# Patient Record
(demographics unavailable — no encounter records)

---

## 2025-06-19 NOTE — PLAN
[TextEntry] : - Consent signed for EUA/hysteroscopy/D&C/polypectomy - Will need pre-op PST - OR  will call with date/time surgery

## 2025-06-19 NOTE — PHYSICAL EXAM
[MA] : MA [Normal] : No focal neurologic defects observed [Fully active, able to carry on all pre-disease performance without restriction] : Status 0 - Fully active, able to carry on all pre-disease performance without restriction [FreeTextEntry2] : Riddhi Stiles MA [de-identified] : soft, non-tender, non-distended [de-identified] : SSE: NEFG, estrogenized vaginal mucosa, grossly normal appearing cervix w/o lesions or nodularity BME: small 8wk size mobile anteverted uterus, no palpable adnexal masses

## 2025-06-19 NOTE — CHIEF COMPLAINT
[Language Line ] : provided by Language Line   [FreeTextEntry1] : [place] Location  [facility] [place] [phone] [address] [place] [address]  CC: Uterine polyp [Interpreters_IDNumber] : [place] [Interpreters_FullName] : [id number] [Interpreters_Relationshiptopatient] : Partner initially ad hoc  [TWNoteComboBox1] : [place]

## 2025-06-19 NOTE — ASSESSMENT
[FreeTextEntry1] : Ms. Brizuela is a 32yo G0 who presents as a referral from Dr. Leone for uterine polyp.  We discussed the importance of assessing the uterine lining to rule out malignancy. Risk factors for uterine malignancy include irregular and/or heavy vaginal bleeding before menopause, postmenopausal bleeding, and identification of a mass in the uterine cavity on imaging. We discussed several options:  Office sampling with endometrial biopsy D&C/HSC under sedation Hysterectomy No treatment   We discussed that an attempt at endometrial sampling is required prior to proceeding with hysterectomy, even if desired, as this can alter our treatment planning. In fact, endometrial cancer can be identified in 20%+ of patients with postmenopausal bleeding and/or irregular premenopausal bleeding in the setting of a suspicious uterine mass. While avoiding sampling will avoid the risk of sampling procedures, it will not provide reassurance that a malignancy is present.  Plan to proceed w/ hysteroscopy D&C polypectomy.  The details of the procedure were reviewed including the need for sedation as well as the risks including pain, infection, bleeding, and uterine perforation. Once the procedure is complete, we will await results to determine whether further interventions are required. After all questions were answered, the patient signed consents for EUA/hysteroscopy D&C polypectomy.

## 2025-06-19 NOTE — HISTORY OF PRESENT ILLNESS
[FreeTextEntry1] : Ms. Brizuela is a 30yo G0 who presents as a referral from Dr. Leone for uterine polyp. Accompanied by partner today. Pt initially declined  and was having her  translate, but ultimately pt agreed to  services.  Overall, pt reports feeling well today w/ no complaints. Presented to Dr. Leone for WWE, TVUS performed and uterine polyp noted.  LMP 5/20/25; Reports regular menses every month, q28-30d  TVUS (5/17/25) Uterus: 4.9 X 3.8 X 5.2; homogeneous Endometrium: polyp is suspected; endometrial thickness 10.5mm Polyp: 2.6mm x 3.2mm x 3.1mm R OV: no adnexal masses; polycystic; Simple cyst 20 x 16.9 x 15.3cm L OV: normal, no cysts identified   HCM   Pap: 04/2025 wnl per pt Mammo: n/a C-scope: n/a   Ob Hx: G0 Gyn Hx: Med Hx: None Surg Hx: None Meds: None All: NKDA Fam Hx: Endometrial cancer (Maternal dx 40s) Soc Hx: Denies tobacco/illicit drug use; Social EtOH

## 2025-07-10 NOTE — PLAN
[TextEntry] : - Recovering well from surgery, meeting early post-op milestones - Reviewed final benign pathology. No role for adjuvant treatment - Can resume routine care w/ GYN - RTO as needed

## 2025-07-10 NOTE — HISTORY OF PRESENT ILLNESS
[FreeTextEntry1] : Ms. Brizuela is a 32 yo now POD#14 (6/26/25) s/p hysteroscopy D&C who presents today for follow-up  Recovering well from surgery, meeting early post-op milestones. Denies vaginal bleeding. Denies fevers/chills/CP/SOB/abnormal vaginal. Tolerating PO w/o issue. +BM. Expecting next menses ~ 07/20/25. Pt very interested in getting back to trying to conceive.  Final Diagnosis 1.  Endometrium, curettings: -Superficial strips of benign endocervical glandular epithelium and rare squamous cells. -Rare benign endometrial glandular epithelium.  2.  Endometrium, polyp, shavings: -Fragments of benign polyp. -Background benign proliferative endometrium. -Fragments of benign smooth muscle.

## 2025-07-10 NOTE — ASSESSMENT
[FreeTextEntry1] : 32 yo now POD#14 (6/26/25) s/p hysteroscopy D&C who presents today for follow-up. Recovering well from surgery and meeting early post-op milestones. Reviewed final benign pathology. No role for adjuvant treatment. Can resume all ADLS. Resume routine care w/ GYN.

## 2025-07-10 NOTE — REASON FOR VISIT
[FreeTextEntry1] : [place] Location  [facility] [place] [phone] [address] [place] [address]  CC: Post-op [Language Line ] : provided by Language Line   [Interpreters_IDNumber] : [id number] [Interpreters_FullName] : Robert [TWNoteComboBox1] : Samoan